# Patient Record
Sex: FEMALE | Race: BLACK OR AFRICAN AMERICAN | NOT HISPANIC OR LATINO | URBAN - METROPOLITAN AREA
[De-identification: names, ages, dates, MRNs, and addresses within clinical notes are randomized per-mention and may not be internally consistent; named-entity substitution may affect disease eponyms.]

---

## 2020-12-24 ENCOUNTER — OFFICE VISIT (OUTPATIENT)
Dept: URBAN - METROPOLITAN AREA CLINIC 70 | Facility: CLINIC | Age: 55
End: 2020-12-24

## 2021-01-21 ENCOUNTER — OFFICE VISIT (OUTPATIENT)
Dept: URBAN - METROPOLITAN AREA CLINIC 70 | Facility: CLINIC | Age: 56
End: 2021-01-21

## 2021-01-21 ENCOUNTER — WEB ENCOUNTER (OUTPATIENT)
Dept: URBAN - METROPOLITAN AREA CLINIC 70 | Facility: CLINIC | Age: 56
End: 2021-01-21

## 2021-01-21 ENCOUNTER — LAB OUTSIDE AN ENCOUNTER (OUTPATIENT)
Dept: URBAN - METROPOLITAN AREA CLINIC 70 | Facility: CLINIC | Age: 56
End: 2021-01-21

## 2021-01-21 ENCOUNTER — OFFICE VISIT (OUTPATIENT)
Dept: URBAN - METROPOLITAN AREA CLINIC 70 | Facility: CLINIC | Age: 56
End: 2021-01-21
Payer: OTHER GOVERNMENT

## 2021-01-21 VITALS
DIASTOLIC BLOOD PRESSURE: 95 MMHG | HEIGHT: 65 IN | HEART RATE: 69 BPM | TEMPERATURE: 95.7 F | WEIGHT: 201.2 LBS | SYSTOLIC BLOOD PRESSURE: 157 MMHG | BODY MASS INDEX: 33.52 KG/M2

## 2021-01-21 DIAGNOSIS — R10.13 EPIGASTRIC DISCOMFORT: ICD-10-CM

## 2021-01-21 DIAGNOSIS — K21.9 GERD: ICD-10-CM

## 2021-01-21 PROCEDURE — 3017F COLORECTAL CA SCREEN DOC REV: CPT | Performed by: NURSE PRACTITIONER

## 2021-01-21 PROCEDURE — G8427 DOCREV CUR MEDS BY ELIG CLIN: HCPCS | Performed by: NURSE PRACTITIONER

## 2021-01-21 PROCEDURE — 99203 OFFICE O/P NEW LOW 30 MIN: CPT | Performed by: NURSE PRACTITIONER

## 2021-01-21 PROCEDURE — G8417 CALC BMI ABV UP PARAM F/U: HCPCS | Performed by: NURSE PRACTITIONER

## 2021-01-21 PROCEDURE — 1036F TOBACCO NON-USER: CPT | Performed by: NURSE PRACTITIONER

## 2021-01-21 RX ORDER — SEMAGLUTIDE 1.34 MG/ML
INJECTION, SOLUTION SUBCUTANEOUS
Qty: 1.5 UNSPECIFIED | Status: ACTIVE | COMMUNITY

## 2021-01-21 RX ORDER — INSULIN GLARGINE 100 [IU]/ML
INJECTION, SOLUTION SUBCUTANEOUS
Qty: 30 UNSPECIFIED | Status: ACTIVE | COMMUNITY

## 2021-01-21 RX ORDER — TORSEMIDE 10 MG/1
TABLET ORAL
Qty: 90 UNSPECIFIED | Status: ACTIVE | COMMUNITY

## 2021-01-21 RX ORDER — HYDRALAZINE HYDROCHLORIDE 25 MG/1
TABLET ORAL
Qty: 180 UNSPECIFIED | Status: ACTIVE | COMMUNITY

## 2021-01-21 RX ORDER — ALBUTEROL SULFATE 90 UG/1
AEROSOL, METERED RESPIRATORY (INHALATION)
Qty: 18 UNSPECIFIED | Status: ACTIVE | COMMUNITY

## 2021-01-21 RX ORDER — LOSARTAN POTASSIUM 100 MG/1
TABLET, FILM COATED ORAL
Qty: 90 UNSPECIFIED | Status: ACTIVE | COMMUNITY

## 2021-01-21 RX ORDER — PANTOPRAZOLE SODIUM 40 MG/1
1 TABLET TABLET, DELAYED RELEASE ORAL ONCE A DAY
Qty: 90 TABLET | Refills: 0 | OUTPATIENT
Start: 2021-01-21

## 2021-01-21 RX ORDER — NIFEDIPINE 60 MG/1
TABLET, EXTENDED RELEASE ORAL
Qty: 180 UNSPECIFIED | Status: ACTIVE | COMMUNITY

## 2021-01-21 RX ORDER — DULOXETINE HYDROCHLORIDE 20 MG/1
CAPSULE, DELAYED RELEASE PELLETS ORAL
Qty: 60 UNSPECIFIED | Status: DISCONTINUED | COMMUNITY

## 2021-01-21 NOTE — HPI-TODAY'S VISIT:
Pt is a 55 yr old female whom presents today as a self referral regarding burping and abdominal pain. Reports eructation with sulfa odor which began Nov 2020.  Denies hematemesis.  Reports mild epigastric discomfort. Occasional reflux of bile depending on if she eats spicy food. Has not been on an acid reducer.  Used to take Protonix 10 yrs ago with good results per pt. Colonoscopy about 7 yrs ago which was normal.  Pt has never had an EGD. Denies rectal bleeding, abnormal wt loss, or family hx of colon cancer.

## 2021-01-26 ENCOUNTER — OFFICE VISIT (OUTPATIENT)
Dept: URBAN - METROPOLITAN AREA SURGERY CENTER 24 | Facility: SURGERY CENTER | Age: 56
End: 2021-01-26
Payer: OTHER GOVERNMENT

## 2021-01-26 DIAGNOSIS — K22.2 ACQUIRED ESOPHAGEAL RING: ICD-10-CM

## 2021-01-26 DIAGNOSIS — K29.60 ADENOPAPILLOMATOSIS GASTRICA: ICD-10-CM

## 2021-01-26 DIAGNOSIS — K29.80 ACUTE DUODENITIS: ICD-10-CM

## 2021-01-26 DIAGNOSIS — K22.8 COLUMNAR-LINED ESOPHAGUS: ICD-10-CM

## 2021-01-26 PROCEDURE — G8907 PT DOC NO EVENTS ON DISCHARG: HCPCS | Performed by: INTERNAL MEDICINE

## 2021-01-26 PROCEDURE — 43239 EGD BIOPSY SINGLE/MULTIPLE: CPT | Performed by: INTERNAL MEDICINE

## 2021-01-26 RX ORDER — SEMAGLUTIDE 1.34 MG/ML
INJECTION, SOLUTION SUBCUTANEOUS
Qty: 1.5 UNSPECIFIED | Status: ACTIVE | COMMUNITY

## 2021-01-26 RX ORDER — HYDRALAZINE HYDROCHLORIDE 25 MG/1
TABLET ORAL
Qty: 180 UNSPECIFIED | Status: ACTIVE | COMMUNITY

## 2021-01-26 RX ORDER — PANTOPRAZOLE SODIUM 40 MG/1
1 TABLET TABLET, DELAYED RELEASE ORAL TWICE A DAY
Qty: 180 TABLET | Refills: 3
Start: 2021-01-21

## 2021-01-26 RX ORDER — PANTOPRAZOLE SODIUM 40 MG/1
1 TABLET TABLET, DELAYED RELEASE ORAL ONCE A DAY
Qty: 90 TABLET | Refills: 0 | Status: ACTIVE | COMMUNITY
Start: 2021-01-21

## 2021-01-26 RX ORDER — LOSARTAN POTASSIUM 100 MG/1
TABLET, FILM COATED ORAL
Qty: 90 UNSPECIFIED | Status: ACTIVE | COMMUNITY

## 2021-01-26 RX ORDER — INSULIN GLARGINE 100 [IU]/ML
INJECTION, SOLUTION SUBCUTANEOUS
Qty: 30 UNSPECIFIED | Status: ACTIVE | COMMUNITY

## 2021-01-26 RX ORDER — NIFEDIPINE 60 MG/1
TABLET, EXTENDED RELEASE ORAL
Qty: 180 UNSPECIFIED | Status: ACTIVE | COMMUNITY

## 2021-01-26 RX ORDER — ALBUTEROL SULFATE 90 UG/1
AEROSOL, METERED RESPIRATORY (INHALATION)
Qty: 18 UNSPECIFIED | Status: ACTIVE | COMMUNITY

## 2021-01-26 RX ORDER — TORSEMIDE 10 MG/1
TABLET ORAL
Qty: 90 UNSPECIFIED | Status: ACTIVE | COMMUNITY

## 2021-03-23 ENCOUNTER — OFFICE VISIT (OUTPATIENT)
Dept: URBAN - METROPOLITAN AREA CLINIC 70 | Facility: CLINIC | Age: 56
End: 2021-03-23

## 2021-03-23 RX ORDER — HYDRALAZINE HYDROCHLORIDE 25 MG/1
TABLET ORAL
Qty: 180 UNSPECIFIED | Status: ACTIVE | COMMUNITY

## 2021-03-23 RX ORDER — INSULIN GLARGINE 100 [IU]/ML
INJECTION, SOLUTION SUBCUTANEOUS
Qty: 30 UNSPECIFIED | Status: ACTIVE | COMMUNITY

## 2021-03-23 RX ORDER — LOSARTAN POTASSIUM 100 MG/1
TABLET, FILM COATED ORAL
Qty: 90 UNSPECIFIED | Status: ACTIVE | COMMUNITY

## 2021-03-23 RX ORDER — TORSEMIDE 10 MG/1
TABLET ORAL
Qty: 90 UNSPECIFIED | Status: ACTIVE | COMMUNITY

## 2021-03-23 RX ORDER — PANTOPRAZOLE SODIUM 40 MG/1
1 TABLET TABLET, DELAYED RELEASE ORAL TWICE A DAY
Qty: 180 TABLET | Refills: 3 | Status: ACTIVE | COMMUNITY
Start: 2021-01-21

## 2021-03-23 RX ORDER — NIFEDIPINE 60 MG/1
TABLET, EXTENDED RELEASE ORAL
Qty: 180 UNSPECIFIED | Status: ACTIVE | COMMUNITY

## 2021-03-23 RX ORDER — ALBUTEROL SULFATE 90 UG/1
AEROSOL, METERED RESPIRATORY (INHALATION)
Qty: 18 UNSPECIFIED | Status: ACTIVE | COMMUNITY

## 2021-03-23 RX ORDER — SEMAGLUTIDE 1.34 MG/ML
INJECTION, SOLUTION SUBCUTANEOUS
Qty: 1.5 UNSPECIFIED | Status: ACTIVE | COMMUNITY

## 2021-03-23 NOTE — HPI-TODAY'S VISIT:
Pt presents today regarding a hernia. She had an EGD 1/26/21 which found a non-obstructive Schatzki ring, a 4 cm hiatal hernia, gastritis, and pathology negative for H pylori.  Protonix was increased to bid. Toda she reports   OF NOTE LOV:  Pt is a 55 yr old female whom presents today as a self referral regarding burping and abdominal pain. Reports eructation with sulfa odor which began Nov 2020.  Denies hematemesis.  Reports mild epigastric discomfort. Occasional reflux of bile depending on if she eats spicy food. Has not been on an acid reducer.  Used to take Protonix 10 yrs ago with good results per pt. Colonoscopy about 7 yrs ago which was normal.  Pt has never had an EGD. Denies rectal bleeding, abnormal wt loss, or family hx of colon cancer.

## 2021-03-29 ENCOUNTER — OFFICE VISIT (OUTPATIENT)
Dept: URBAN - METROPOLITAN AREA CLINIC 70 | Facility: CLINIC | Age: 56
End: 2021-03-29
Payer: OTHER GOVERNMENT

## 2021-03-29 ENCOUNTER — DASHBOARD ENCOUNTERS (OUTPATIENT)
Age: 56
End: 2021-03-29

## 2021-03-29 ENCOUNTER — LAB OUTSIDE AN ENCOUNTER (OUTPATIENT)
Dept: URBAN - METROPOLITAN AREA CLINIC 70 | Facility: CLINIC | Age: 56
End: 2021-03-29

## 2021-03-29 VITALS
HEART RATE: 82 BPM | SYSTOLIC BLOOD PRESSURE: 145 MMHG | WEIGHT: 197.8 LBS | HEIGHT: 65 IN | DIASTOLIC BLOOD PRESSURE: 87 MMHG | BODY MASS INDEX: 32.96 KG/M2 | TEMPERATURE: 97.2 F

## 2021-03-29 DIAGNOSIS — K21.9 GERD WITHOUT ESOPHAGITIS: ICD-10-CM

## 2021-03-29 DIAGNOSIS — Z12.11 COLON CANCER SCREENING: ICD-10-CM

## 2021-03-29 DIAGNOSIS — K58.2 IRRITABLE BOWEL SYNDROME WITH BOTH CONSTIPATION AND DIARRHEA: ICD-10-CM

## 2021-03-29 PROBLEM — 266435005: Status: ACTIVE | Noted: 2021-03-29

## 2021-03-29 PROBLEM — 10743008: Status: ACTIVE | Noted: 2021-03-29

## 2021-03-29 PROCEDURE — 99204 OFFICE O/P NEW MOD 45 MIN: CPT | Performed by: NURSE PRACTITIONER

## 2021-03-29 RX ORDER — LOSARTAN POTASSIUM 100 MG/1
TABLET, FILM COATED ORAL
Qty: 90 UNSPECIFIED | Status: ACTIVE | COMMUNITY

## 2021-03-29 RX ORDER — INSULIN GLARGINE 100 [IU]/ML
INJECTION, SOLUTION SUBCUTANEOUS
Qty: 30 UNSPECIFIED | Status: ACTIVE | COMMUNITY

## 2021-03-29 RX ORDER — HYDRALAZINE HYDROCHLORIDE 25 MG/1
TABLET ORAL
Qty: 180 UNSPECIFIED | Status: ACTIVE | COMMUNITY

## 2021-03-29 RX ORDER — FAMOTIDINE 40 MG/1
1 TABLET AT BEDTIME TABLET, FILM COATED ORAL ONCE A DAY
Qty: 90 | Refills: 3 | OUTPATIENT
Start: 2021-03-29

## 2021-03-29 RX ORDER — PANTOPRAZOLE SODIUM 40 MG/1
1 TABLET TABLET, DELAYED RELEASE ORAL TWICE A DAY
Qty: 180 TABLET | Refills: 3 | Status: ACTIVE | COMMUNITY
Start: 2021-01-21

## 2021-03-29 RX ORDER — SEMAGLUTIDE 1.34 MG/ML
INJECTION, SOLUTION SUBCUTANEOUS
Qty: 1.5 UNSPECIFIED | Status: ACTIVE | COMMUNITY

## 2021-03-29 RX ORDER — TORSEMIDE 10 MG/1
TABLET ORAL
Qty: 90 UNSPECIFIED | Status: ACTIVE | COMMUNITY

## 2021-03-29 RX ORDER — ALBUTEROL SULFATE 90 UG/1
AEROSOL, METERED RESPIRATORY (INHALATION)
Qty: 18 UNSPECIFIED | Status: ACTIVE | COMMUNITY

## 2021-03-29 RX ORDER — PANTOPRAZOLE SODIUM 40 MG/1
1 TABLET TABLET, DELAYED RELEASE ORAL TWICE A DAY
Qty: 180 TABLET | Refills: 3
Start: 2021-01-21

## 2021-03-29 RX ORDER — NIFEDIPINE 60 MG/1
TABLET, EXTENDED RELEASE ORAL
Qty: 180 UNSPECIFIED | Status: ACTIVE | COMMUNITY

## 2021-03-29 NOTE — HPI-TODAY'S VISIT:
Pt presents today regarding a hernia. She had an EGD 1/26/21 which found a non-obstructive Schatzki ring, a 4 cm hiatal hernia, gastritis, and pathology negative for H pylori.  Protonix was increased to bid. Today she reports ongoing reflux into her throat.  Minimal improvement with Protonix bid. Reports BM's that alternate between constipation and diarrhea.  Will have 3-4 episodes of loose stools, then go 3-4 days without a Bm and have hard josy. Denies rectal bleeding, abnormal wt loss, or family hx of colon cancer.   OF NOTE LOV:  Pt is a 55 yr old female whom presents today as a self referral regarding burping and abdominal pain. Reports eructation with sulfa odor which began Nov 2020.  Denies hematemesis.  Reports mild epigastric discomfort. Occasional reflux of bile depending on if she eats spicy food. Has not been on an acid reducer.  Used to take Protonix 10 yrs ago with good results per pt. Colonoscopy about 7 yrs ago which was normal.  Pt has never had an EGD. Denies rectal bleeding, abnormal wt loss, or family hx of colon cancer.

## 2021-04-27 ENCOUNTER — OFFICE VISIT (OUTPATIENT)
Dept: URBAN - METROPOLITAN AREA SURGERY CENTER 24 | Facility: SURGERY CENTER | Age: 56
End: 2021-04-27

## 2021-04-28 ENCOUNTER — TELEPHONE ENCOUNTER (OUTPATIENT)
Dept: URBAN - METROPOLITAN AREA CLINIC 70 | Facility: CLINIC | Age: 56
End: 2021-04-28

## 2021-04-28 RX ORDER — PANTOPRAZOLE SODIUM 40 MG/1
1 TABLET TABLET, DELAYED RELEASE ORAL TWICE A DAY
Qty: 180 TABLET | Refills: 3
Start: 2021-01-21